# Patient Record
(demographics unavailable — no encounter records)

---

## 2024-10-26 NOTE — HISTORY OF PRESENT ILLNESS
[de-identified] :     The patient   DID NOT COME   for the visit.  This HPI is  in a note Titled:  Non - Appointment   and reflects a summary and review of records : including previous and most recent  Labs, body imaging, consults and progress notes, operative and pathology reports, EKG reports, ED records, found in [employer], [employer],  [employer] and any additional records brought in by  the patient at the time of the visit.  It is for History taking purposes     PCP:Mian  46 yo F w h/o Migraines--rx w Botox, Depression, Hypothyroidism  Lymphocytic Colitis Hemorrhoids  3/12/20 Init CC: Gives a long h/o diarrhea since childhood: then pain, bloat, diarrhea: then stress was the trigger States 3-4 yrs ago saw Dr. Edwards and colonocsopy showed a mild colitis and stool w parasite: Blastocystis Hominis--rx w metronidazole 500 tid x 10 days , no sustained response Recently has noted rectal pain: spasms ~ 5mins, 1-2 x /month Also noted Increase diarrhea, usu PC--triggers: salad,diary, caffeine, stress BMs: # 6-7, 3-5x/d, not nocturnal, no blood or mucous + Bloat, + flatulance, bloat No recent travel, ABX, sick contact 3/12/20    Labs: IBD panel, esr, crp, b12. iron profile, fa, cmet, cbc--> wnl  2/26/21 Colonoscopy: mild erythema/granularity thru-out, rectosigmoid w trace superficial erosions              Random Bx c/w Lympocytic coiltis              Rx w Budesonide 9mg daily 5/11/23 Heme: LORNE 2/2 menorrhagia--s/p Injectafer 6/27/24 Labs: cbc, cmet, Iron, Ferritin, CRP, HgbA1c, TSH, FA, Vit D --> wnl  9/4/24 : Ferritin=43, Iron=53, tTGAbs--> wnl  9/13/24: Dr. Gonsalez: intermittent diarrhea w certain foods--> coffee, milk, salads                                0ccas rectal spasm                Takes Ozempic intermittently 11/5/24     Today:  Feeling well, no c/o , CP, SOB/ YARBROUGH, Cough, Wheeze, Palpitations, edema    Most recent labs eviewed :     reviewed endoscopic findings and pathology in detail with patient:  Colonoscopy: 2/26/21 all of the patients questions were addressed and answered  11/5/24  * BMs: #  * Constipation-no * Diarrhea-->  * Bloating-->  * Flatulence-->  * Abd pain-rectal : intermittent -->  * Nausea-no * Vomit-no * Belching-no * Regurgitation-no * Ht burn-no * Throat Clearing-no * Globus-no * Cough-no * Hoarseness-no * Dysphagia-no  * Gurgling-no * Melena-no * BPBPR-no * Anorexia-no * Wt. Loss-no

## 2024-10-26 NOTE — ASSESSMENT
[FreeTextEntry1] :   1. Diarrhea associated intermittnet rectal pain Long H/O Diarrhea--since child saavedra  9/2017  Colitis : mild increase IEL's c/w Lymphocytic Colitis 3/12/20    Labs: IBD panel, esr, crp, b12. iron profile, fa, cmet, cbc--> wnl  2/26/21 Colonoscopy: mild erythema/granularity thru-out, rectosigmoid w trace superficial erosions              Random Bx c/w Lymphocytic coiltis 6/27/24 Labs: cbc, cmet, Iron, Ferritin, CRP, HgbA1c, TSH, FA, Vit D --> wnl  9/4/24 : Ferritin=43, Iron=53, tTGAbs--> wnl               Rx-ed  previously  w Budesonide 9mg daily  R/O Active  Lymphocytic coiltis  (>3 stools daily or >1 watery stool daily)          concomittant functional bowel d/o  P: diet: low/mod fiber, lactose free, low fat     Probiotics        2. Hemorrhoids:  well - controlled.  No pain,  swelling,  itch,  bleeding * Discussed the potential complications of thrombosis,  pain,  infection,  swelling, itching,  bleeding  * Moderate - Fiber Diet was  emphasized * 6  --  8 cups of decaffeinated fluid daily * Sitz Bathes BID,  No:  Anusol HC  Suppos / Cream  [place] BID * No:  Tucks BID,   No:  Balneol Lotion,   No:  Calmoseptine Oint,   ++ Prep H prn * No:  Colorectal surgical evaluation for possible ablation

## 2024-10-26 NOTE — HISTORY OF PRESENT ILLNESS
[de-identified] :     The patient   DID NOT COME   for the visit.  This HPI is  in a note Titled:  Non - Appointment   and reflects a summary and review of records : including previous and most recent  Labs, body imaging, consults and progress notes, operative and pathology reports, EKG reports, ED records, found in [employer], [employer],  [employer] and any additional records brought in by  the patient at the time of the visit.  It is for History taking purposes     PCP:Mian  44 yo F w h/o Migraines--rx w Botox, Depression, Hypothyroidism  Lymphocytic Colitis Hemorrhoids  3/12/20 Init CC: Gives a long h/o diarrhea since childhood: then pain, bloat, diarrhea: then stress was the trigger States 3-4 yrs ago saw Dr. Edwards and colonocsopy showed a mild colitis and stool w parasite: Blastocystis Hominis--rx w metronidazole 500 tid x 10 days , no sustained response Recently has noted rectal pain: spasms ~ 5mins, 1-2 x /month Also noted Increase diarrhea, usu PC--triggers: salad,diary, caffeine, stress BMs: # 6-7, 3-5x/d, not nocturnal, no blood or mucous + Bloat, + flatulance, bloat No recent travel, ABX, sick contact 3/12/20    Labs: IBD panel, esr, crp, b12. iron profile, fa, cmet, cbc--> wnl  2/26/21 Colonoscopy: mild erythema/granularity thru-out, rectosigmoid w trace superficial erosions              Random Bx c/w Lympocytic coiltis              Rx w Budesonide 9mg daily 5/11/23 Heme: LORNE 2/2 menorrhagia--s/p Injectafer 6/27/24 Labs: cbc, cmet, Iron, Ferritin, CRP, HgbA1c, TSH, FA, Vit D --> wnl  9/4/24 : Ferritin=43, Iron=53, tTGAbs--> wnl  9/13/24: Dr. Gonsalez: intermittent diarrhea w certain foods--> coffee, milk, salads                                0ccas rectal spasm                Takes Ozempic intermittently 11/5/24     Today:  Feeling well, no c/o , CP, SOB/ YARBROUGH, Cough, Wheeze, Palpitations, edema    Most recent labs eviewed :     reviewed endoscopic findings and pathology in detail with patient:  Colonoscopy: 2/26/21 all of the patients questions were addressed and answered  11/5/24  * BMs: #  * Constipation-no * Diarrhea-->  * Bloating-->  * Flatulence-->  * Abd pain-rectal : intermittent -->  * Nausea-no * Vomit-no * Belching-no * Regurgitation-no * Ht burn-no * Throat Clearing-no * Globus-no * Cough-no * Hoarseness-no * Dysphagia-no  * Gurgling-no * Melena-no * BPBPR-no * Anorexia-no * Wt. Loss-no

## 2024-10-26 NOTE — ASSESSMENT
[FreeTextEntry1] :   1. Diarrhea associated intermittnet rectal pain Long H/O Diarrhea--since child saavedra  9/2017  Colitis : mild increase IEL's c/w Lymphocytic Colitis 3/12/20    Labs: IBD panel, esr, crp, b12. iron profile, fa, cmet, cbc--> wnl  2/26/21 Colonoscopy: mild erythema/granularity thru-out, rectosigmoid w trace superficial erosions              Random Bx c/w Lymphocytic coiltis 6/27/24 Labs: cbc, cmet, Iron, Ferritin, CRP, HgbA1c, TSH, FA, Vit D --> wnl  9/4/24 : Ferritin=43, Iron=53, tTGAbs--> wnl               Rx-ed  previously  w Budesonide 9mg daily  R/O Active  Lymphocytic coiltis  (>3 stools daily or >1 watery stool daily)          concomittant functional bowel d/o  P: diet: low/mod fiber, lactose free, low fat     Probiotics        2. Hemorrhoids:  well - controlled.  No pain,  swelling,  itch,  bleeding * Discussed the potential complications of thrombosis,  pain,  infection,  swelling, itching,  bleeding  * Moderate - Fiber Diet was  emphasized * 6  --  8 cups of decaffeinated fluid daily * Sitz Bathes BID,  No:  Anusol HC  Suppos / Cream  [place] BID * No:  Tucks BID,   No:  Balneol Lotion,   No:  Calmoseptine Oint,   ++ Prep H prn * No:  Colorectal surgical evaluation for possible ablation

## 2024-10-26 NOTE — ASSESSMENT
[FreeTextEntry1] :   1. Diarrhea associated intermittnet rectal pain Long H/O Diarrhea--since child saavedra  9/2017  Colitis : mild increase IEL's c/w Lymphocytic Colitis 3/12/20    Labs: IBD panel, esr, crp, b12. iron profile, fa, cmet, cbc--> wnl  2/26/21 Colonoscopy: mild erythema/granularity thru-out, rectosigmoid w trace superficial erosions              Random Bx c/w Lymphocytic coiltis 6/27/24 Labs: cbc, cmet, Iron, Ferritin, CRP, HgbA1c, TSH, FA, Vit D --> wnl  9/4/24 : Ferritin=43, Iron=53, tTGAbs--> wnl               Rx-ed  previously  w Budesonide 9mg daily  R/O Active  Lymphocytic coiltis  (>3 stools daily or >1 watery stool daily)          concomittant functional bowel d/o  P: diet: low/mod fiber, lactose free, low fat     Probiotics        2. Hemorrhoids:  well - controlled.  No pain,  swelling,  itch,  bleeding * Discussed the potential complications of thrombosis,  pain,  infection,  swelling, itching,  bleeding  * Moderate - Fiber Diet was  emphasized * 6  --  8 cups of decaffeinated fluid daily * Sitz Bathes BID,  No:  Anusol HC  Suppos / Cream  [place] BID * No:  Tucks BID,   No:  Balneol Lotion,   No:  Calmoseptine Oint,   ++ Prep H prn * No:  Colorectal surgical evaluation for possible ablation

## 2024-10-26 NOTE — HISTORY OF PRESENT ILLNESS
[de-identified] :     The patient   DID NOT COME   for the visit.  This HPI is  in a note Titled:  Non - Appointment   and reflects a summary and review of records : including previous and most recent  Labs, body imaging, consults and progress notes, operative and pathology reports, EKG reports, ED records, found in [employer], [email or web address],  [employer] and any additional records brought in by  the patient at the time of the visit.  It is for History taking purposes     PCP:Mian  46 yo F w h/o Migraines--rx w Botox, Depression, Hypothyroidism  Lymphocytic Colitis Hemorrhoids  3/12/20 Init CC: Gives a long h/o diarrhea since childhood: then pain, bloat, diarrhea: then stress was the trigger States 3-4 yrs ago saw Dr. Edwards and colonocsopy showed a mild colitis and stool w parasite: Blastocystis Hominis--rx w metronidazole 500 tid x 10 days , no sustained response Recently has noted rectal pain: spasms ~ 5mins, 1-2 x /month Also noted Increase diarrhea, usu PC--triggers: salad,diary, caffeine, stress BMs: # 6-7, 3-5x/d, not nocturnal, no blood or mucous + Bloat, + flatulance, bloat No recent travel, ABX, sick contact 3/12/20    Labs: IBD panel, esr, crp, b12. iron profile, fa, cmet, cbc--> wnl  2/26/21 Colonoscopy: mild erythema/granularity thru-out, rectosigmoid w trace superficial erosions              Random Bx c/w Lympocytic coiltis              Rx w Budesonide 9mg daily 5/11/23 Heme: LORNE 2/2 menorrhagia--s/p Injectafer 6/27/24 Labs: cbc, cmet, Iron, Ferritin, CRP, HgbA1c, TSH, FA, Vit D --> wnl  9/4/24 : Ferritin=43, Iron=53, tTGAbs--> wnl  9/13/24: Dr. Gonsalez: intermittent diarrhea w certain foods--> coffee, milk, salads                                0ccas rectal spasm                Takes Ozempic intermittently 11/5/24     Today:  Feeling well, no c/o , CP, SOB/ YARBROUGH, Cough, Wheeze, Palpitations, edema    Most recent labs eviewed :     reviewed endoscopic findings and pathology in detail with patient:  Colonoscopy: 2/26/21 all of the patients questions were addressed and answered  11/5/24  * BMs: #  * Constipation-no * Diarrhea-->  * Bloating-->  * Flatulence-->  * Abd pain-rectal : intermittent -->  * Nausea-no * Vomit-no * Belching-no * Regurgitation-no * Ht burn-no * Throat Clearing-no * Globus-no * Cough-no * Hoarseness-no * Dysphagia-no  * Gurgling-no * Melena-no * BPBPR-no * Anorexia-no * Wt. Loss-no

## 2024-10-26 NOTE — REVIEW OF SYSTEMS
[As Noted in HPI] : as noted in HPI [Proptosis] : no proptosis [Negative] : Respiratory [Skin Lesions] : no skin lesions [Confused] : no confusion [Easy Bruising] : no tendency for easy bruising

## 2024-11-05 NOTE — ASSESSMENT
[FreeTextEntry1] :  1. Diarrhea associated intermittnet rectal pain Long H/O Diarrhea--since child saavedra  9/2017  Colitis : mild increase IEL's c/w Lymphocytic Colitis 3/12/20    Labs: IBD panel, esr, crp, b12. iron profile, fa, cmet, cbc--> wnl  2/26/21 Colonoscopy: mild erythema/granularity thru-out, rectosigmoid w trace superficial erosions              Random Bx c/w Lymphocytic coiltis 6/27/24 Labs: cbc, cmet, Iron, Ferritin, CRP, HgbA1c, TSH, FA, Vit D --> wnl  9/4/24 : Ferritin=43, Iron=53, tTGAbs--> wnl               Rx-ed  previously  w Budesonide 9mg daily--> pt doesnt remember the response  R/O Active  Lymphocytic coiltis  (>3 stools daily or >1 watery stool daily)          probably concomittant functional bowel d/o  P: diet: low/mod fiber, lactose free, low fat     Probiotics     For Colonoscopy to re-evaluate the activity of inflammation/ ? fibrosis        2. Hemorrhoids:  well - controlled.  No pain,  swelling,  itch,  bleeding * Discussed the potential complications of thrombosis,  pain,  infection,  swelling, itching,  bleeding  *  Currently Low - Fiber Diet was  emphasized * 6  --  8 cups of decaffeinated fluid daily * Sitz Bathes BID,  No:  Anusol HC  Suppos / Cream  [place] BID * No:  Tucks BID,   No:  Balneol Lotion,   No:  Calmoseptine Oint,   ++ Prep H prn * No:  Colorectal surgical evaluation for possible ablation       Informed Consent: * The risks & Benefits of   Colonoscopy were discussed w patient. * This included but was not limited to perforation, bleeding,  missed lesions possibly requiring surgery. * Pt. understands & agrees to the procedures. The following instructions in regards to the prep and medically essential ( cardiac, pulmonary, sz, psych, endocrine)  pre-op medication administration was reviewed and emphasized with the patient . * Pt. advised to D/C  ASA/NSAIDs   & OZEMPIC 7  Days   PTP. * [ +++ ]  Dulcolax / Miralax / Mag. Citrate ,  [     ] Prepopik/ Clenpiq ,  [     ] Osmo Prep,  [    ] GoLytely,  prep. reviewed w Pt. * Hold  [           ] AM of procedure. * Hold  [           ] PM  before procedure. * Take  [           ] PM  before procedure. * Take  [           ] AM of procedure.

## 2024-11-05 NOTE — HISTORY OF PRESENT ILLNESS
[de-identified] :   This HPI  reflects a summary and review of records : including previous and most recent  Labs, body imaging, consults and progress notes, operative and pathology reports, EKG reports, ED records, found in [employer], [employer],  [email or web address] and any additional records brought in by  the patient at the time of the visit.      PCP:Mian  46 yo F w h/o Migraines--rx w Botox, Depression, Hypothyroidism  Lymphocytic Colitis Hemorrhoids  3/12/20 Init CC: Gives a long h/o diarrhea since childhood: then pain, bloat, diarrhea: then stress was the trigger States 3-4 yrs ago saw Dr. Edwards and colonocsopy showed a mild colitis and stool w parasite: Blastocystis Hominis--rx w metronidazole 500 tid x 10 days , no sustained response Recently has noted rectal pain: spasms ~ 5mins, 1-2 x /month Also noted Increase diarrhea, usu PC--triggers: salad,diary, caffeine, stress BMs: # 6-7, 3-5x/d, not nocturnal, no blood or mucous + Bloat, + flatulance, bloat No recent travel, ABX, sick contact 3/12/20    Labs: IBD panel, esr, crp, b12. iron profile, fa, cmet, cbc--> wnl  2/26/21 Colonoscopy: mild erythema/granularity thru-out, rectosigmoid w trace superficial erosions              Random Bx c/w Lympocytic coiltis              Rx w Budesonide 9mg daily 5/11/23 Heme: LORNE 2/2 menorrhagia--s/p Injectafer 6/27/24 Labs: cbc, cmet, Iron, Ferritin, CRP, HgbA1c, TSH, FA, Vit D --> wnl  9/4/24 : Ferritin=43, Iron=53, tTGAbs--> wnl  9/13/24: Dr. Gonsalez: intermittent diarrhea w certain foods--> coffee, milk, salads                                0ccas rectal spasm                Takes Ozempic intermittently 11/5/24     Today:  Feeling well, no c/o , CP, SOB/ YARBROUGH, Cough, Wheeze, Palpitations, edema    Most recent labs eviewed :     reviewed endoscopic findings and pathology in detail with patient:  Colonoscopy: 2/26/21 all of the patients questions were addressed and answered  11/5/24  pt dosent remember the response to Budesonide in the past              Presently has BMs: # 5-7, 3-4x/D usually PC, no brbpr/mucous WI             No anorexia wt loss             No recently ABX, travel, sick contact             + Bloat/Flatulance, + urgency             usu PC--triggers: salad,diary, caffeine, stress            + occas rectal spasm            Ozempic did make it worse  * Constipation-no * Nausea-no * Vomit-no * Belching-no * Regurgitation-no * Ht burn-no * Throat Clearing-no * Globus-no * Cough-no * Hoarseness-no * Dysphagia-no * Gurgling-no * Melena-no * BPBPR-no * Anorexia-no * Wt. Loss-no

## 2025-03-12 NOTE — END OF VISIT
[Time Spent: ___ minutes] : I have spent [unfilled] minutes of time on the encounter which excludes teaching and separately reported services. [FreeTextEntry3] :  I, Chelo Huerta, am scribing for and in the presence of Dr. Flor Gonsalez in the following sections: HISTORY OF PRESENT ILLNESS; REVIEW OF SYSTEMS; PHYSICAL EXAM; ASSESSMENT/ PLAN. I, Flor Gonsalez, personally performed the services described in the documentation, reviewed the documentation recorded by the scribe in my presence, and it accurately and completely records my words and actions. 03/12/25

## 2025-03-12 NOTE — PHYSICAL EXAM
[Alert] : alert [Well Nourished] : well nourished [Healthy Appearance] : healthy appearance [No Acute Distress] : no acute distress [Well Developed] : well developed [Normal Voice/Communication] : normal voice communication [No Respiratory Distress] : no respiratory distress [Oriented x3] : oriented to person, place, and time [Normal Affect] : the affect was normal [Normal Insight/Judgement] : insight and judgment were intact [Normal Mood] : the mood was normal [Kyphosis] : no kyphosis present [Scoliosis] : no scoliosis

## 2025-03-12 NOTE — HISTORY OF PRESENT ILLNESS
[Home] : at home, [unfilled] , at the time of the visit. [Telehealth (audio & video)] : This visit was provided via telehealth using real-time 2-way audio visual technology. [Verbal consent obtained from patient] : the patient, [unfilled] [Medical Office: ([facility] [place])___] : at the medical office located in  [FreeTextEntry1] : Ms. BASIL SANTOS is a 46 year old female from [place] initially self referred for hypothyroidism s/p total thyroidectomy 1997 in [place] today follow up  was not cancer per patient has had 2 kids, has tubal ligation   on Synthroid  175mcg since 12/2020 dose increased last visit from 150mcg qd now TFts on the high side of normal labs reviewed  , forgets to cut in a half on Sundays   Complains of gaining weight has had diarrhea on and off , with certain food, had colonoscopy 2-3 yrs ago ok per pt, just avoids certain food coffee, milk, salads , green has diarrhea   has had irregular periods had problems getting pregnant but eventually go pregnant naturally denies hirsutism  had periods every 3-6 months , now every 1.5 months  also pt reports "rectal pains" sharp, for 5 min at a time   3/20/24  feels better lost weight, fitting in her closes , enjoying the way she looks , wants to be outside   9/13/24 feels very good, that she lost some weight takes Ozempic on and off, no active complains  brought labs done June 2024 from PCP reviewed, very good   03/12/25 She reports feeling well but more tired than usual. She thinks it could be due to low Vitamin B12. She will check Vitamin B12 with PCP. I advised she can take B12 lozenges in the mean time  In January she had a nasty cough for 3 weeks non-stop.   CBC shows slightly low WBC, 3.4. Past labs show WBC also slightly low. It started in 2020. Was normal May 2023 when checked by Dr. Jimenes. Reviewed his notes with the patient. Will check again for next visit. I advised her to check also with Dr. Jimenes. Patient agreed to see him.  She sees her PCP in September. I will plan to see her once a year in March.